# Patient Record
Sex: MALE | Race: ASIAN | NOT HISPANIC OR LATINO | URBAN - METROPOLITAN AREA
[De-identification: names, ages, dates, MRNs, and addresses within clinical notes are randomized per-mention and may not be internally consistent; named-entity substitution may affect disease eponyms.]

---

## 2018-05-24 ENCOUNTER — AMBULATORY - RIVER FALLS (OUTPATIENT)
Dept: FAMILY MEDICINE | Facility: CLINIC | Age: 23
End: 2018-05-24

## 2018-05-24 ENCOUNTER — COMMUNICATION - RIVER FALLS (OUTPATIENT)
Dept: FAMILY MEDICINE | Facility: CLINIC | Age: 23
End: 2018-05-24

## 2018-08-26 ENCOUNTER — OFFICE VISIT - RIVER FALLS (OUTPATIENT)
Dept: FAMILY MEDICINE | Facility: CLINIC | Age: 23
End: 2018-08-26

## 2018-10-16 ENCOUNTER — OFFICE VISIT - RIVER FALLS (OUTPATIENT)
Dept: FAMILY MEDICINE | Facility: CLINIC | Age: 23
End: 2018-10-16

## 2018-10-16 ASSESSMENT — MIFFLIN-ST. JEOR: SCORE: 2110.14

## 2019-02-01 ENCOUNTER — OFFICE VISIT - RIVER FALLS (OUTPATIENT)
Dept: FAMILY MEDICINE | Facility: CLINIC | Age: 24
End: 2019-02-01

## 2019-02-01 ENCOUNTER — COMMUNICATION - RIVER FALLS (OUTPATIENT)
Dept: FAMILY MEDICINE | Facility: CLINIC | Age: 24
End: 2019-02-01

## 2019-02-01 LAB
BASOPHILS # BLD MANUAL: 0 10*3/UL
BASOPHILS NFR BLD MANUAL: 0.4 %
EOSINOPHIL # BLD MANUAL: 0.1 10*3/UL
EOSINOPHIL NFR BLD MANUAL: 1.2 %
ERYTHROCYTE [DISTWIDTH] IN BLOOD BY AUTOMATED COUNT: 12.6 % (ref 11.5–15.5)
HCT VFR BLD AUTO: 47 % (ref 37–53)
HGB BLD-MCNC: 16.5 G/DL (ref 13.5–17.5)
LYMPHOCYTES # BLD MANUAL: 1.8 10*3/UL (ref 0.9–2.9)
LYMPHOCYTES NFR BLD MANUAL: 37.9 %
MCH RBC QN AUTO: 28.9 PG (ref 26–34)
MCHC RBC AUTO-ENTMCNC: 35.1 G/DL (ref 32–36)
MCV RBC AUTO: 82 FL (ref 80–100)
MONOCYTES # BLD MANUAL: 0.5 10*3/UL
MONOCYTES NFR BLD MANUAL: 10.7 %
NEUTROPHILS # BLD MANUAL: 2.4 10*3/UL (ref 1.7–7)
NEUTROPHILS NFR BLD MANUAL: 49.8 %
PLATELET # BLD AUTO: 198 10*3/UL (ref 140–440)
PMV BLD: 10.6 FL (ref 6.5–11)
RBC # BLD AUTO: 5.71 10*6/UL (ref 4.3–5.9)
WBC # BLD AUTO: 4.9 10*3/UL (ref 4.5–11)

## 2019-02-01 ASSESSMENT — MIFFLIN-ST. JEOR: SCORE: 2128.28

## 2022-02-11 VITALS
OXYGEN SATURATION: 98 % | WEIGHT: 241 LBS | BODY MASS INDEX: 32.64 KG/M2 | TEMPERATURE: 98.6 F | SYSTOLIC BLOOD PRESSURE: 128 MMHG | RESPIRATION RATE: 16 BRPM | HEIGHT: 72 IN | DIASTOLIC BLOOD PRESSURE: 72 MMHG | HEART RATE: 72 BPM

## 2022-02-11 VITALS
HEART RATE: 76 BPM | HEIGHT: 72 IN | BODY MASS INDEX: 33.18 KG/M2 | DIASTOLIC BLOOD PRESSURE: 84 MMHG | WEIGHT: 245 LBS | SYSTOLIC BLOOD PRESSURE: 132 MMHG | TEMPERATURE: 98.2 F

## 2022-02-11 VITALS
DIASTOLIC BLOOD PRESSURE: 74 MMHG | TEMPERATURE: 97.2 F | HEART RATE: 57 BPM | SYSTOLIC BLOOD PRESSURE: 124 MMHG | OXYGEN SATURATION: 99 %

## 2022-02-15 NOTE — PROGRESS NOTES
Chief Complaint    Pt c/o fever and body pain. Was in [place] last month.  History of Present Illness      Chief complaint as above reviewed and confirmed with patient.  Pt presents to the clinic with concerns re: fever muscle aches and uri sx.  He just returned from home country of [place] returning to school at [employer].  he returned 1-27-19 and became ill 1-28-19. He feels feverish but has not taken temp.  Has had muscle aches, rhionrrhea, congestion, mild cough and mild ST.  No sob or difficulty breathing.  No chest pain, no rash.  No known exposures while in [place].  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  NO known mosquito exposures, in fact states there are no mosquitos this time of the year in the city . No known exposure to ill people.   Had influenza vaccine this year.  he feels some what better today but still quite fatigued.  Review of Systems      Review of systems is negative with the exception of those noted in HPI          Physical Exam   Vitals & Measurements    T: 98.2   F (Tympanic)  HR: 76(Peripheral)  BP: 132/84     HT: 72.25 in  WT: 245 lb  BMI: 32.99           Vitals as above per nursing documentation           Constitutional : nad appears well          Ears: ears patent B, TMS intact, noninjected           Nose: nasal mucosa is non-ededmatous. no discharge           Throat: pharynx is erythematous, no tonsillar hypertrophy, no exudate           Neck: neck supple, no adenopathy, no thyromegaly, no rigidity           Lungs: lungs CTA', no Wheezes, rhonchi or rales           Heart: heart RRR, nl S1, S2 no murmur           skin:  No rashes            CXR:  negative/normal chest,      cbc : WNL         Assessment/Plan       1. Influenza-like illness (R69)         day 5 of SELVIN, no indication for influenza testing.  Lower likelihood of any travel illness as pt has uri sx, no current fever, no known exposures.  [place] but would not expect associated ST, Cough,  rhinorrhea and he spent virtually no time outdoors/no mosquito exposure known, same is true of Japanese encephalitis and zika.  Other travel illness in that area would include typhoid which would not be typical presentation     May be other viral illness as well.  Close observation, ibuprofen for muscle aches/fever, fu for persistent or worsening sx.  If fevers or flu like illness persist consider thick/thin monge for malaria and I/D consultation though more likely secondary to influenza..                Cough (R05)         Ordered:          CBC w/ Diff/Plt (Request), Priority: Urgent, Cough  Fever          XR Chest PA/Lat (Request), Cough  Fever                Fever (R50.9)         Ordered:          CBC w/ Diff/Plt (Request), Priority: Urgent, Cough  Fever          XR Chest PA/Lat (Request), Cough  Fever                Orders:         ibuprofen, = 1 tab(s) ( 800 mg ), PO, TID, # 30 tab(s), 0 Refill(s), Type: Maintenance, Pharmacy: [facility], 1 tab(s) Oral tid, (Ordered)  Patient Information     Name:MENDOZA TREJO MILTONJADEN MILTON      Address:      [address] [place] [id number]     Sex:Male     YOB: 1995     [phone]     Emergency Contact:JUAN CARLOS FLETCHER     MRN:[id number]     FIN:[id number]     Location:[facility]     Date of Service:02/01/2019      Primary Care Physician:       NONE ,       Attending Physician:       Nora Hilario PA-C, [phone]  Problem List/Past Medical History    Ongoing     Obesity    Historical     No qualifying data  Procedure/Surgical History     No previous procedures        Medications     Leslie 650: pain, 0 Refill(s).     Cheston: cold, 0 Refill(s).     ibuprofen 800 mg oral tablet: 800 mg, 1 tab(s), PO, TID, 30 tab(s), 0 Refill(s).          Allergies    No Known Medication Allergies  Social History    Smoking Status - 02/01/2019     Never smoker     Tobacco      Never  (less than 100 in lifetime), 10/16/2018  Immunizations      Vaccine Date Status      influenza virus vaccine, inactivated 10/16/2018 Given  Lab Results       Lab Results (Last 4 results within 90 days)        WBC: 4.9 (02/01/19 12:43:00)       RBC: 5.71 (02/01/19 12:43:00)       Hgb: 16.5 (02/01/19 12:43:00)       Hct: 47.0 (02/01/19 12:43:00)       MCV: 82 (02/01/19 12:43:00)       MCH: 28.9 (02/01/19 12:43:00)       MCHC: 35.1 (02/01/19 12:43:00)       RDW: 12.6 (02/01/19 12:43:00)       Platelet: 198 (02/01/19 12:43:00)       MPV: 10.6 (02/01/19 12:43:00)       Lymphocytes: 37.9 (02/01/19 12:43:00)       Abs Lymphocytes: 1.8 (02/01/19 12:43:00)       Neutrophils: 49.8 (02/01/19 12:43:00)       Abs Neutrophils: 2.4 (02/01/19 12:43:00)       Monocytes: 10.7 (02/01/19 12:43:00)       Abs Monocytes: 0.5 (02/01/19 12:43:00)       Eosinophils: 1.2 (02/01/19 12:43:00)       Abs Eosinophils: 0.1 (02/01/19 12:43:00)       Basophils: 0.4 (02/01/19 12:43:00)       Abs Basophils: 0.0 (02/01/19 12:43:00)

## 2022-02-15 NOTE — PROGRESS NOTES
"   Patient:   JADEN URBINA            MRN: [id number]            FIN: [id number]               Age:   22 years     Sex:  Male     :  1995   Associated Diagnoses:   Insect bites   Author:   Diana Ortega      Visit Information      Date of Service: 2018 11:25 am  Performing Location: [facility]  Encounter#: [id number]      Chief Complaint   2018 11:29 AM CDT   \"bites\" on lower legs.  Itching.        History of Present Illness    from [facility] here for multiple bites to lower legs which occured yesterday after walking around campus.  Sites are very itchy and he has not tried anything to control itching.  \"I didn't know what I could take for this\"  normally healthy male  was concerned with possible spider bites  no fever, no muscle pain/cramping      Review of Systems   Constitutional:  Negative.    Ear/Nose/Mouth/Throat:  Negative.    Respiratory:  Negative.    Cardiovascular:  Negative.    Gastrointestinal:  Negative.    Endocrine:  Negative.    Immunologic:  Negative.    Musculoskeletal:  Negative.    Integumentary:  Negative except as documented in history of present illness.    Neurologic:  Negative.       Health Status   Allergies:    Allergic Reactions (Selected)  No Known Medication Allergies      Physical Examination   Vital Signs   2018 11:29 AM CDT Temperature Tympanic 97.2 DegF  LOW    Peripheral Pulse Rate 57 bpm  LOW    Systolic Blood Pressure 124 mmHg    Diastolic Blood Pressure 74 mmHg    Mean Arterial Pressure 91 mmHg    Oxygen Saturation 99 %      General:  Alert and oriented, No acute distress.    Eye:  Pupils are equal, round and reactive to light.    HENT:  Normocephalic.    Respiratory:  Respirations are non-labored.    Cardiovascular:  Regular rhythm, No edema.    Musculoskeletal:  Normal range of motion, Normal gait.    Integumentary:  Warm, Dry, Pink, small scattered papular insect bites to lower legs  each one <2mm, some " are scratched so top is abraided  no purulent discharge, no evidence of cellulitis  no vesicles  bites on consolidated below knees and above ankles  about 15 on right leg and 10 on left.    Neurologic:  Alert, Oriented, Normal sensory, No focal deficits.    Psychiatric:  Cooperative, Appropriate mood & affect, Normal judgment.       Review / Management   Results review:  Lab results   5/24/2018 2:56 PM CDT QuantiFERON TB Nil Value <0.00 IU/mL    QuantiFERON TB Gold NEGATIVE    QuantiFERON Nil Value 0.04 IU/mL    QuantiFERON Mitogen Nil Value 8.83 IU/mL   .       Impression and Plan   Diagnosis     Insect bites ([id number] W57.XXXA).     Plan:  unfortunately I cannot tell him what the insect was that caused the multiple bites, gnats, chiggers, etc  I reassured him that [place] is not reknowned for venomous spiders  can use benadryl but will cause drowsiness, can use topical calamine or 1-2% hydrocortisone   cool wash cloth, avoid areas with long grass or treks in the woods.

## 2022-02-15 NOTE — NURSING NOTE
Comprehensive Intake Entered On:  2/1/2019 12:16 PM CST    Performed On:  2/1/2019 12:08 PM CST by Kaity Helm               Summary   Chief Complaint :   Pt c/o fever and body pain. Was in [place] last month.    Weight Measured :   245 lb(Converted to: 245 lb 0 oz, 111.13 kg)    Height Measured :   72.25 in(Converted to: 6 ft 0 in, 183.51 cm)    Body Mass Index :   32.99 kg/m2 (HI)    Body Surface Area :   2.38 m2   Systolic Blood Pressure :   132 mmHg (HI)    Diastolic Blood Pressure :   84 mmHg (HI)    Mean Arterial Pressure :   100 mmHg   Peripheral Pulse Rate :   76 bpm   Temperature Tympanic :   98.2 DegF(Converted to: 36.8 DegC)    Kaity Helm - 2/1/2019 12:08 PM CST   Health Status   Allergies Verified? :   Yes   Medication History Verified? :   Yes   Medical History Verified? :   Yes   Pre-Visit Planning Status :   Completed   Tobacco Use? :   Never smoker   Kaity Helm - 2/1/2019 12:08 PM CST   Meds / Allergies   (As Of: 2/1/2019 12:16:03 PM CST)   Allergies (Active)   No Known Medication Allergies  Estimated Onset Date:   Unspecified ; Created By:   Soraida White; Reaction Status:   Active ; Category:   Drug ; Substance:   No Known Medication Allergies ; Type:   Allergy ; Updated By:   Soraida White; Reviewed Date:   10/16/2018 3:57 PM CDT        Medication List   (As Of: 2/1/2019 12:16:03 PM CST)   Prescription/Discharge Order    ipratropium nasal  :   ipratropium nasal ; Status:   Completed ; Ordered As Mnemonic:   Atrovent 42 mcg/inh nasal spray ; Simple Display Line:   2 spray(s), nasal, qid, PRN: as needed for cold symptoms, 1 EA, 2 Refill(s) ; Ordering Provider:   Ascencion Copeland PA-C; Catalog Code:   ipratropium nasal ; Order Dt/Tm:   10/16/2018 4:03:32 PM            Home Meds    Miscellaneous Prescription  :   Miscellaneous Prescription ; Status:   Documented ; Ordered As Mnemonic:   Cheston ; Simple Display Line:   cold, 0 Refill(s) ; Catalog Code:    Miscellaneous Prescription ; Order Dt/Tm:   2/1/2019 12:15:18 PM          Miscellaneous Prescription  :   Miscellaneous Prescription ; Status:   Documented ; Ordered As Mnemonic:   Leslie 650 ; Simple Display Line:   pain, 0 Refill(s) ; Catalog Code:   Miscellaneous Prescription ; Order Dt/Tm:   2/1/2019 12:14:57 PM

## 2022-02-15 NOTE — PROGRESS NOTES
Patient:   JADEN URBINA            MRN: [id number]            FIN: [id number]               Age:   22 years     Sex:  Male     :  1995   Associated Diagnoses:   Head cold   Author:   Ascencion Copeland PA-C      Chief Complaint   10/16/2018 3:48 PM CDT   Pt here for productive cough and headache x 2 days        History of Present Illness   Chief complaint and symptoms noted above and confirmed with patient   2 day hx of productive cough and headache, his roomates have similar sxs  he has been taking 2 medications from [place] for cold and cough (sinurest and medler, which are both for head congestionand sinus sxs)      Review of Systems   Constitutional:  No fever.    Ear/Nose/Mouth/Throat:  Nasal congestion, Sore throat.    Respiratory:  Cough, Sputum production.    Gastrointestinal:  Negative.       Health Status   Allergies:    Allergic Reactions (Selected)  No Known Medication Allergies   Medications: not on any regular medications   Problem list:    All Problems  Obesity / SNOMED CT [id number] / Probable      Histories   Past Medical History:    No active or resolved past medical history items have been selected or recorded.   Family History:    No family history items have been selected or recorded.   Procedure history:    No previous procedures.      Physical Examination   Vital Signs   10/16/2018 3:48 PM CDT Temperature Tympanic 98.6 DegF    Peripheral Pulse Rate 72 bpm    Pulse Site Radial artery    Respiratory Rate 16 br/min    Systolic Blood Pressure 128 mmHg    Diastolic Blood Pressure 72 mmHg    Mean Arterial Pressure 91 mmHg    BP Site Right arm    Oxygen Saturation 98 %      Measurements from flowsheet : Measurements   10/16/2018 3:48 PM CDT Height Measured - Standard 72.25 in    Weight Measured - Standard 241 lb    BSA 2.36 m2    Body Mass Index 32.46 kg/m2  HI      General:  No acute distress.    HENT:  Tympanic membranes are clear, No sinus tenderness, nares are patent,  ooropharynx mildly enlarged and inflamed without exudate.    Neck:  Supple, Non-tender, No lymphadenopathy.    Respiratory:  Lungs are clear to auscultation.    Cardiovascular:  Normal rate, Regular rhythm, No murmur.       Impression and Plan   Diagnosis     Head cold ([id number] J00).     Summary:  advised to keep taking current cough and cold medications, will add atrovent nasal spray, follow up if not improving.    Orders     Orders   Pharmacy:  Atrovent 42 mcg/inh nasal spray (Prescribe): 2 spray(s), nasal, qid, PRN: as needed for cold symptoms, # 1 EA, 2 Refill(s), Type: Maintenance, Pharmacy: [facility], 2 spray(s) Nasal qid,PRN:as needed for cold symptoms.     Orders   Charges (Evaluation and Management):  [id number] office outpatient visit 15 minutes (Charge) (Order): Quantity: 1, Head cold.